# Patient Record
Sex: MALE | Race: WHITE | NOT HISPANIC OR LATINO | Employment: OTHER | URBAN - NONMETROPOLITAN AREA
[De-identification: names, ages, dates, MRNs, and addresses within clinical notes are randomized per-mention and may not be internally consistent; named-entity substitution may affect disease eponyms.]

---

## 2022-07-21 ENCOUNTER — OFFICE VISIT (OUTPATIENT)
Dept: OTOLARYNGOLOGY | Facility: CLINIC | Age: 67
End: 2022-07-21

## 2022-07-21 VITALS
SYSTOLIC BLOOD PRESSURE: 113 MMHG | HEART RATE: 78 BPM | DIASTOLIC BLOOD PRESSURE: 76 MMHG | HEIGHT: 68 IN | WEIGHT: 174 LBS | BODY MASS INDEX: 26.37 KG/M2 | TEMPERATURE: 98.7 F

## 2022-07-21 DIAGNOSIS — H90.3 SENSORINEURAL HEARING LOSS (SNHL), BILATERAL: ICD-10-CM

## 2022-07-21 DIAGNOSIS — H61.23 BILATERAL IMPACTED CERUMEN: Primary | ICD-10-CM

## 2022-07-21 DIAGNOSIS — T16.1XXA FOREIGN BODY OF RIGHT EAR, INITIAL ENCOUNTER: ICD-10-CM

## 2022-07-21 DIAGNOSIS — T16.2XXA FOREIGN BODY OF LEFT EAR, INITIAL ENCOUNTER: ICD-10-CM

## 2022-07-21 PROBLEM — H72.92 PERFORATION OF LEFT TYMPANIC MEMBRANE: Status: ACTIVE | Noted: 2022-07-21

## 2022-07-21 PROCEDURE — 69200 CLEAR OUTER EAR CANAL: CPT | Performed by: EMERGENCY MEDICINE

## 2022-07-21 PROCEDURE — 99203 OFFICE O/P NEW LOW 30 MIN: CPT | Performed by: EMERGENCY MEDICINE

## 2022-07-21 RX ORDER — LISINOPRIL 5 MG/1
TABLET ORAL
COMMUNITY
Start: 2022-04-25

## 2022-07-21 RX ORDER — CARVEDILOL 6.25 MG/1
6.25 TABLET ORAL 2 TIMES DAILY WITH MEALS
COMMUNITY
Start: 2022-06-06

## 2022-07-21 RX ORDER — ESCITALOPRAM OXALATE 10 MG/1
TABLET ORAL
COMMUNITY

## 2022-07-21 RX ORDER — MULTIVIT WITH MIN/MFOLATE/K2 340-15/3 G
125 POWDER (GRAM) ORAL DAILY
COMMUNITY

## 2022-07-21 RX ORDER — HYDROCHLOROTHIAZIDE 25 MG/1
1 TABLET ORAL DAILY
COMMUNITY
Start: 2022-07-11

## 2022-07-21 RX ORDER — FUROSEMIDE 20 MG/1
20 TABLET ORAL 2 TIMES DAILY
COMMUNITY
Start: 2022-06-02

## 2022-07-21 RX ORDER — CETIRIZINE HYDROCHLORIDE 10 MG/1
10 CAPSULE, LIQUID FILLED ORAL DAILY
COMMUNITY

## 2022-07-21 RX ORDER — DEXLANSOPRAZOLE 60 MG/1
CAPSULE, DELAYED RELEASE ORAL
COMMUNITY
Start: 2022-06-21

## 2022-07-21 RX ORDER — ROSUVASTATIN CALCIUM 20 MG/1
1 TABLET, COATED ORAL
COMMUNITY
Start: 2022-06-02

## 2022-07-21 RX ORDER — METFORMIN HYDROCHLORIDE 500 MG/1
TABLET, EXTENDED RELEASE ORAL
COMMUNITY
Start: 2022-06-23

## 2022-07-21 NOTE — PROGRESS NOTES
BLANCA Minaya  [facility]HR[facility] EAR NOSE & THROAT  [address], [address]  [facility] [address]  Fax [phone]  Phone [phone]      Visit Type: NEW PATIENT   Chief Complaint   Patient presents with   • Ear Problem     Left ear pain  Rt pressure, stopped up        HPI  He complains of otalgia, ear fullness, ear pressure, an eardrum perforation and hearing loss. The symptoms are not localized to a particular location. The patient has had moderate symptoms. The symptoms have been relatively constant for the last several years. There have been no identified factors that aggravate the symptoms. There have been no factors that have improved the symptoms.    He reports he has been having ear candling done every 6 weeks for the last several years. He has hearing aids but does not wear them because he doesn't feel like they work very well. He was recently told by his PCP that he had a tympanic membrane perforation.      Past Medical History:   Diagnosis Date   • Diabetic acetonemia (HCC)    • HL (hearing loss)    • Hyperlipemia    • Hypertension        History reviewed. No pertinent surgical history.    Family History: His family history is not on file.     Social History: He  reports that he has quit smoking. He does not have any smokeless tobacco history on file. He reports previous alcohol use. He reports previous drug use.    Home Medications:  Cetirizine HCl, Fluticasone Furoate-Vilanterol, carvedilol, dexlansoprazole, escitalopram, furosemide, hydroCHLOROthiazide, lisinopril, metFORMIN ER, rosuvastatin, and vitamin d    Allergies:  He has No Known Allergies.       Vital Signs:   Temp:  [98.7 °F (37.1 °C)] 98.7 °F (37.1 °C)  Heart Rate:  [78] 78  BP: (113)/(76) 113/76  ENT Physical Exam  Constitutional  Appearance: patient appears well-developed, well-nourished and well-groomed,  Communication/Voice: communication appropriate for developmental age;  vocal quality normal;  Head and Face  Appearance: head appears normal, face appears normal and face appears atraumatic;  Palpation: facial palpation normal;  Salivary: glands normal;  Ear  Hearing: impaired to conversational voice;  Ear Canals: bilateral ear canals impacted cerumen observed;  Nose  External Nose: nares patent bilaterally; external nose normal;  Internal Nose: nasal mucosa normal; septum normal; bilateral inferior turbinates normal;  Oral Cavity/Oropharynx  Lips: normal;  Teeth: normal;  Gums: gingiva normal;  Tongue: normal;  Oral mucosa: normal;  Hard palate: normal;     Foreign Body Removal    Date/Time: 7/21/2022 10:43 AM  Performed by: Loretta Torre APRN  Authorized by: Loretta Torre APRN   Consent: Verbal consent obtained.  Consent given by: patient  Body area: ear  Location details: right ear  Localization method: ENT speculum and magnification  Removal mechanism: curette and alligator forceps  1 objects recovered.  Objects recovered: hearing aid ear piece  Post-procedure assessment: foreign body removed  Patient tolerance: patient tolerated the procedure well with no immediate complications  Foreign Body Removal    Date/Time: 7/21/2022 10:44 AM  Performed by: Loretta Torre APRN  Authorized by: Loretta Torre APRN   Consent: Verbal consent obtained.  Consent given by: patient  Patient identity confirmed: verbally with patient  Body area: ear  Location details: left ear  Localization method: ENT speculum and magnification  Removal mechanism: alligator forceps  1 objects recovered.  Objects recovered: hearing aid ear piece  Post-procedure assessment: foreign body removed  Patient tolerance: patient tolerated the procedure well with no immediate complications  Ear microscopy with Bilateral Foreign Body Removal     Date/Time: 7/21/2022 10:45 AM  Performed by: Loretta Torre APRN  Authorized by: Loretta Torre APRN     Ear examination was performed utilizing binocular  microscopy.  Right auricle:   normal:   Right ear canal:   impacted cerumen, a foreign body.   Left auricle:   normal:   Left ear canal:   impacted cerumen, a foreign body.     Procedure:    Cerumen was removed from the bilateral ears with a curette and a forceps.   Post-procedure details:     Inspection after the procedure revealed an intact TM.    No medication was applied to the ear canal.    The procedure was tolerated well, no immediate complications.       Result Review    RESULTS REVIEW    I have reviewed the patients old records in the chart.     Assessment & Plan    Diagnoses and all orders for this visit:    1. Bilateral impacted cerumen (Primary)    2. Sensorineural hearing loss (SNHL), bilateral    3. Foreign body of right ear, initial encounter    4. Foreign body of left ear, initial encounter    Other orders  -     Foreign Body Removal  -     Foreign Body Removal  -     $ Binocular Microscopy            Call for ear problems, especially change of hearing, ear pain or dizziness.      Return in about 3 months (around 10/21/2022) for Follow up with BLANCA Bills for ear cleaning.      BLANCA Minaya  07/21/22  10:46 CDT

## 2022-10-17 ENCOUNTER — TELEPHONE (OUTPATIENT)
Dept: OTOLARYNGOLOGY | Facility: CLINIC | Age: 67
End: 2022-10-17

## 2022-11-30 ENCOUNTER — OFFICE VISIT (OUTPATIENT)
Dept: OTOLARYNGOLOGY | Facility: CLINIC | Age: 67
End: 2022-11-30

## 2022-11-30 VITALS — WEIGHT: 217 LBS | BODY MASS INDEX: 32.89 KG/M2 | HEIGHT: 68 IN | TEMPERATURE: 97.7 F

## 2022-11-30 DIAGNOSIS — H61.23 BILATERAL IMPACTED CERUMEN: Primary | ICD-10-CM

## 2022-11-30 PROCEDURE — 69210 REMOVE IMPACTED EAR WAX UNI: CPT | Performed by: EMERGENCY MEDICINE

## 2022-11-30 NOTE — PROGRESS NOTES
Ear Cerumen Removal    Date/Time: 11/30/2022 9:36 AM  Performed by: Loretta Torre APRN  Authorized by: Loretta Torre APRN   Consent: Verbal consent obtained.  Consent given by: patient  Patient identity confirmed: verbally with patient    Anesthesia:  Local Anesthetic: none  Location details: left ear and right ear  Patient tolerance: patient tolerated the procedure well with no immediate complications  Comments: TMs intact bilaterally  Procedure type: instrumentation, curette   Sedation:  Patient sedated: no

## 2023-05-30 ENCOUNTER — OFFICE VISIT (OUTPATIENT)
Dept: OTOLARYNGOLOGY | Facility: CLINIC | Age: 68
End: 2023-05-30

## 2023-05-30 VITALS — HEIGHT: 68 IN | BODY MASS INDEX: 33.19 KG/M2 | WEIGHT: 219 LBS

## 2023-05-30 DIAGNOSIS — T16.1XXA FOREIGN BODY OF RIGHT EAR, INITIAL ENCOUNTER: Primary | ICD-10-CM

## 2023-05-30 DIAGNOSIS — H61.21 IMPACTED CERUMEN OF RIGHT EAR: ICD-10-CM

## 2023-05-30 NOTE — PROGRESS NOTES
Ear Microscopy with Right Ear Foreign Body Removal     Date/Time: 5/30/2023 10:33 AM  Performed by: Loretta Torre APRN  Authorized by: Loretta Torre APRN     Ear examination was performed utilizing binocular microscopy.  Right auricle:   normal:   Right ear canal:   impacted cerumen, a foreign body.   Left auricle:   normal:     Procedure:    Cerumen was removed from the right ear with a curette, a forceps and a suction.   Procedure:    1 Foreign object(s) were observed in the right ear canal. Removal was performed with alligator forceps.Foreign body: hearing aid piece.  Post-procedure details:     Inspection after the procedure revealed an intact TM.    Foreign body removal was successful.    No medication was applied to the ear canal.    The procedure was tolerated well, no immediate complications.

## 2023-12-15 ENCOUNTER — OFFICE VISIT (OUTPATIENT)
Dept: OTOLARYNGOLOGY | Facility: CLINIC | Age: 68
End: 2023-12-15
Payer: MEDICARE

## 2023-12-15 VITALS — WEIGHT: 214 LBS | HEIGHT: 68 IN | BODY MASS INDEX: 32.43 KG/M2 | TEMPERATURE: 98 F

## 2023-12-15 DIAGNOSIS — H61.23 BILATERAL IMPACTED CERUMEN: Primary | ICD-10-CM

## 2024-06-09 NOTE — PROGRESS NOTES
Ear Cerumen Removal    Date/Time: 12/15/2023 10:22 AM    Performed by: Loretta Torre APRN  Authorized by: Loretta Torre APRN  Consent: Verbal consent obtained.  Consent given by: patient  Location details: left ear and right ear  Comments: TM intact bilaterally  Procedure type: instrumentation, curette, suction         
show

## 2024-11-18 ENCOUNTER — OFFICE VISIT (OUTPATIENT)
Dept: OTOLARYNGOLOGY | Facility: CLINIC | Age: 69
End: 2024-11-18
Payer: MEDICARE

## 2024-11-18 VITALS
HEIGHT: 68 IN | TEMPERATURE: 98.6 F | BODY MASS INDEX: 30.62 KG/M2 | HEART RATE: 78 BPM | DIASTOLIC BLOOD PRESSURE: 61 MMHG | SYSTOLIC BLOOD PRESSURE: 107 MMHG | WEIGHT: 202 LBS

## 2024-11-18 DIAGNOSIS — H61.23 BILATERAL IMPACTED CERUMEN: Primary | ICD-10-CM

## 2024-11-18 PROCEDURE — 69210 REMOVE IMPACTED EAR WAX UNI: CPT | Performed by: EMERGENCY MEDICINE

## 2024-11-18 PROCEDURE — 1160F RVW MEDS BY RX/DR IN RCRD: CPT | Performed by: EMERGENCY MEDICINE

## 2024-11-18 PROCEDURE — 1159F MED LIST DOCD IN RCRD: CPT | Performed by: EMERGENCY MEDICINE

## 2024-11-18 RX ORDER — ALBUTEROL SULFATE 90 UG/1
2 INHALANT RESPIRATORY (INHALATION) EVERY 4 HOURS PRN
COMMUNITY
Start: 2024-08-08

## 2024-11-18 RX ORDER — MECLIZINE HYDROCHLORIDE 25 MG/1
25 TABLET ORAL 3 TIMES DAILY PRN
COMMUNITY

## 2024-11-18 NOTE — PROGRESS NOTES
Ear Cerumen Removal    Date/Time: 11/18/2024 2:06 PM    Performed by: Loretta Black APRN  Authorized by: Loretta Black APRN  Consent: Verbal consent obtained.  Consent given by: patient    Anesthesia:  Local Anesthetic: none  Location details: left ear and right ear  Patient tolerance: patient tolerated the procedure well with no immediate complications  Comments: TM intact bilaterally  Procedure type: instrumentation, suction, curette   Sedation:  Patient sedated: no

## 2025-05-20 ENCOUNTER — OFFICE VISIT (OUTPATIENT)
Dept: OTOLARYNGOLOGY | Facility: CLINIC | Age: 70
End: 2025-05-20
Payer: COMMERCIAL

## 2025-05-20 VITALS
TEMPERATURE: 98 F | RESPIRATION RATE: 20 BRPM | HEIGHT: 68 IN | WEIGHT: 202 LBS | HEART RATE: 91 BPM | BODY MASS INDEX: 30.62 KG/M2 | DIASTOLIC BLOOD PRESSURE: 68 MMHG | SYSTOLIC BLOOD PRESSURE: 107 MMHG

## 2025-05-20 DIAGNOSIS — H61.23 BILATERAL IMPACTED CERUMEN: Primary | ICD-10-CM

## 2025-05-20 RX ORDER — SEMAGLUTIDE 0.68 MG/ML
0.25 INJECTION, SOLUTION SUBCUTANEOUS WEEKLY
COMMUNITY
Start: 2025-04-29

## 2025-05-20 RX ORDER — EMPAGLIFLOZIN 25 MG/1
1 TABLET, FILM COATED ORAL DAILY
COMMUNITY
Start: 2025-05-11

## 2025-05-20 RX ORDER — ZOLPIDEM TARTRATE 5 MG/1
5 TABLET ORAL
COMMUNITY
Start: 2025-04-09

## 2025-05-20 RX ORDER — MONTELUKAST SODIUM 10 MG/1
10 TABLET ORAL DAILY
COMMUNITY

## 2025-05-20 NOTE — PROGRESS NOTES
Ear Cerumen Removal    Date/Time: 5/20/2025 11:20 AM    Performed by: Loretta Black APRN  Authorized by: Loretta Black APRN  Consent: Verbal consent obtained  Consent given by: patient    Anesthesia:  Local Anesthetic: none  Location details: left ear and right ear  Patient tolerance: patient tolerated the procedure well with no immediate complications  Comments: TM intact bilaterally  Procedure type: instrumentation, curette, suction